# Patient Record
Sex: MALE | Race: WHITE | Employment: FULL TIME | URBAN - METROPOLITAN AREA
[De-identification: names, ages, dates, MRNs, and addresses within clinical notes are randomized per-mention and may not be internally consistent; named-entity substitution may affect disease eponyms.]

---

## 2021-08-30 ENCOUNTER — LAB REQUISITION (OUTPATIENT)
Dept: LAB | Facility: CLINIC | Age: 26
End: 2021-08-30

## 2021-08-30 LAB — HBV SURFACE AB SERPL IA-ACNC: 16.28 M[IU]/ML

## 2021-08-30 PROCEDURE — 86706 HEP B SURFACE ANTIBODY: CPT | Performed by: INTERNAL MEDICINE

## 2021-10-05 ENCOUNTER — HOSPITAL ENCOUNTER (EMERGENCY)
Facility: CLINIC | Age: 26
Discharge: HOME | End: 2021-10-05
Attending: EMERGENCY MEDICINE | Admitting: EMERGENCY MEDICINE
Payer: OTHER MISCELLANEOUS

## 2021-10-05 ENCOUNTER — APPOINTMENT (OUTPATIENT)
Dept: CT IMAGING | Facility: CLINIC | Age: 26
End: 2021-10-05
Attending: EMERGENCY MEDICINE
Payer: OTHER MISCELLANEOUS

## 2021-10-05 ENCOUNTER — APPOINTMENT (OUTPATIENT)
Dept: GENERAL RADIOLOGY | Facility: CLINIC | Age: 26
End: 2021-10-05
Attending: EMERGENCY MEDICINE
Payer: OTHER MISCELLANEOUS

## 2021-10-05 VITALS
WEIGHT: 165 LBS | HEIGHT: 73 IN | DIASTOLIC BLOOD PRESSURE: 72 MMHG | BODY MASS INDEX: 21.87 KG/M2 | SYSTOLIC BLOOD PRESSURE: 114 MMHG | HEART RATE: 91 BPM | OXYGEN SATURATION: 98 % | RESPIRATION RATE: 16 BRPM | TEMPERATURE: 98.5 F

## 2021-10-05 DIAGNOSIS — S16.1XXA ACUTE STRAIN OF NECK MUSCLE, INITIAL ENCOUNTER: ICD-10-CM

## 2021-10-05 DIAGNOSIS — S09.90XA CLOSED HEAD INJURY, INITIAL ENCOUNTER: ICD-10-CM

## 2021-10-05 PROCEDURE — 72040 X-RAY EXAM NECK SPINE 2-3 VW: CPT

## 2021-10-05 PROCEDURE — 99284 EMERGENCY DEPT VISIT MOD MDM: CPT | Mod: 25

## 2021-10-05 PROCEDURE — 70450 CT HEAD/BRAIN W/O DYE: CPT

## 2021-10-05 PROCEDURE — 250N000013 HC RX MED GY IP 250 OP 250 PS 637: Performed by: EMERGENCY MEDICINE

## 2021-10-05 RX ORDER — IBUPROFEN 600 MG/1
600 TABLET, FILM COATED ORAL ONCE
Status: COMPLETED | OUTPATIENT
Start: 2021-10-05 | End: 2021-10-05

## 2021-10-05 RX ORDER — ACETAMINOPHEN 500 MG
1000 TABLET ORAL ONCE
Status: COMPLETED | OUTPATIENT
Start: 2021-10-05 | End: 2021-10-05

## 2021-10-05 RX ORDER — CYCLOBENZAPRINE HCL 10 MG
10 TABLET ORAL 3 TIMES DAILY PRN
Qty: 20 TABLET | Refills: 0 | Status: SHIPPED | OUTPATIENT
Start: 2021-10-05 | End: 2021-10-11

## 2021-10-05 RX ADMIN — IBUPROFEN 600 MG: 600 TABLET ORAL at 14:35

## 2021-10-05 RX ADMIN — ACETAMINOPHEN 1000 MG: 500 TABLET, FILM COATED ORAL at 13:59

## 2021-10-05 ASSESSMENT — ENCOUNTER SYMPTOMS
NECK PAIN: 1
TROUBLE SWALLOWING: 0
ABDOMINAL PAIN: 0
HEADACHES: 1
NUMBNESS: 1
DIZZINESS: 1
SHORTNESS OF BREATH: 0

## 2021-10-05 ASSESSMENT — MIFFLIN-ST. JEOR: SCORE: 1792.32

## 2021-10-05 NOTE — ED TRIAGE NOTES
Presents to ED via EMS. 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 Pt c/o neck pain. EMS applied cervical collar. ABCs intact. A&OX4. No LOC.

## 2021-10-05 NOTE — ED NOTES
Bed: ED21  Expected date: 10/5/21  Expected time: 12:41 PM  Means of arrival: Ambulance  Comments:  bv3

## 2021-10-05 NOTE — ED PROVIDER NOTES
History   Chief Complaint:  Fall and Neck Pain       HPI   Piyush Arzola is a 26 year old male who presents with fall resulting in headache and neck pain. 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 After his injury he had numbness and tingling in his extremities and chest but this has resolved since arrival to the emergency department. He has a severe headache behind his left eye that started after his injury and notes his symptoms don't feel similar to previous migraines but is as severe as a migraine. Patient did ambulate after his fall but notes he was dizzy. He also notes he has severe neck pain. He denies any loss of consciousness, abdominal pain, trouble swallowing, or shortness of breath.     Review of Systems   HENT: Negative for trouble swallowing.    Respiratory: Negative for shortness of breath.    Gastrointestinal: Negative for abdominal pain.   Musculoskeletal: Positive for neck pain.   Neurological: Positive for dizziness, numbness and headaches. Negative for syncope.   All other systems reviewed and are negative.      Allergies:  The patient has no known allergies.     Medications:  The patient is not currently taking any prescribed medications.    Past Medical History:     The patient denies past medical history.     Social History:  The patient presents with acquaintance.    Physical Exam     Patient Vitals for the past 24 hrs:   BP Temp Temp src Pulse Resp SpO2 Height Weight   10/05/21 1530 114/72 -- -- 91 16 98 % -- --   10/05/21 1500 112/79 -- -- 92 -- -- -- --   10/05/21 1445 129/80 -- -- 102 -- -- -- --   10/05/21 1430 116/72 -- -- 79 -- 99 % -- --   10/05/21 1415 113/73 -- -- 81 -- 98 % -- --   10/05/21 1400 124/80 -- -- 99 -- 98 % -- --   10/05/21 1330 118/79 -- -- 95 -- 97 % -- --   10/05/21 1315 118/72 -- -- 83 -- 98 % -- --   10/05/21 1300 128/80 -- -- 104 -- 98 % -- --  "  10/05/21 1250 129/82 98.5  F (36.9  C) Oral 108 16 95 % 1.854 m (6' 1\") 74.8 kg (165 lb)       Physical Exam  General: Adult male sitting upright  Eyes: PERRL, Conjunctive within normal limits.  EOMI.   HENT: No scalp tenderness or palpable hematoma or skull deformity.  No hemotympanum.  Moist mucous membranes, oropharynx clear.   Neck: Maintained in rigid cervical collar.  C-spine precautions kept in place.  Tender mid cervical spine midline without step-off or crepitus.  CV: Normal S1S2, no murmur, rub or gallop. Regular rate and rhythm.  Radial pulses palpable and equal bilaterally.  Resp: Clear to auscultation bilaterally, no wheezes, rales or rhonchi. Normal respiratory effort.  MSK: No edema. Nontender. Normal active range of motion of all 4 extremities..  Skin: Warm and dry. No rashes or lesions or ecchymoses on visible skin.  Neuro: Alert and oriented. Responds appropriately to all questions and commands. No focal findings appreciated. Normal muscle tone.  5 out of 5 finger abduction, thumb opposition and wrist extension strength bilaterally.  Sensation intact to light touch over all dermatomes of the bilateral upper extremities.  Psych: Appropriate mood and affect.     Emergency Department Course   Imaging:  Cervical spine XR, 2-3 views  There is normal alignment of the cervical vertebrae;   however, there is straightening of normal cervical lordosis. Vertebral   body heights of the cervical spine are normal. Craniocervical   alignment is normal. There is no evidence for fracture of the cervical   spine.   Per radiology    CT Head w/o Contrast  Normal head CT.     Radiation dose for this scan was reduced using automated exposure   control, adjustment of the mA and/or kV according to patient size, or   iterative reconstruction technique.  Per radiology    Emergency Department Course:  Reviewed:  I reviewed nursing notes and vitals    Assessments:  1319 I obtained history and examined the patient as noted " above.   1522 I rechecked the patient and explained findings.     Interventions:  1359 Tylenol 1,000 mg PO  1435 ibuprofen 600 mg PO    Disposition:  The patient was discharged to home.     Impression & Plan       Medical Decision Making:  This patient presents with a headache and neck pain after a fall.  The differential diagnosis includes skull fracture, epidural hematoma, subdural hematoma, intracerebral hemorrhage, and traumatic subarachnoid hemorrhage, cervical strain/sprain, cervical fracture etc.  There are no physical signs of skull fracture or other bony fracture on exam and the patient is well-appearing, but given severity of headache, head CT was obtained. Fortunately, no signs of intracerebral bleed or skull fracture were detected during this visit on CT imaging. The patient is aware that he may have a concussion. Delayed bleed seems unlikely in this healthy young male but this is discussed in discharge instructions. Closed head injury instructions were given.  He also presents for evaluation of neck pain after this fall. Clinical examination is most consistent with cervical strain. There is no clinical or radiographic evidence of fracture. There is no evidence of cervical radiculopathy, ligamentous instability, myelopathy, dissection, spinal tumor or abscess at this time. I discussed worrisome symptoms/signs, if they were to evolve, that should prompt the patient to follow up more quickly or return to the ED. There are no red flag symptoms to suggest we need further workup or advanced imaging at this point. Their head to toe trauma exam is otherwise benign and reassuring. Supportive outpatient management is indicated. He understands importance of follow-up with PCP within the next 3 to 5 days with any ongoing symptoms. He may consider concussion clinic as well. All questions were answered prior to discharge.    Diagnosis:    ICD-10-CM    1. Closed head injury, initial encounter  S09.90XA    2. Acute  strain of neck muscle, initial encounter  S16.1XXA        Discharge Medications:  Discharge Medication List as of 10/5/2021  3:24 PM      START taking these medications    Details   cyclobenzaprine (FLEXERIL) 10 MG tablet Take 1 tablet (10 mg) by mouth 3 times daily as needed for muscle spasms, Disp-20 tablet, R-0, Local Print             Scribe Disclosure:  I, Kerri Pierson, am serving as a scribe at 1:19 PM on 10/5/2021 to document services personally performed by Anita Mitchell MD based on my observations and the provider's statements to me.            Anita Mitchell MD  10/06/21 [id number]

## 2021-10-05 NOTE — ED NOTES
Pt ambulated well in hallway. Complaining of some neck pain with movement, no other symptoms. Pt ate food.

## 2022-02-10 ENCOUNTER — LAB REQUISITION (OUTPATIENT)
Dept: LAB | Facility: CLINIC | Age: 27
End: 2022-02-10

## 2022-02-10 PROCEDURE — U0003 INFECTIOUS AGENT DETECTION BY NUCLEIC ACID (DNA OR RNA); SEVERE ACUTE RESPIRATORY SYNDROME CORONAVIRUS 2 (SARS-COV-2) (CORONAVIRUS DISEASE [COVID-19]), AMPLIFIED PROBE TECHNIQUE, MAKING USE OF HIGH THROUGHPUT TECHNOLOGIES AS DESCRIBED BY CMS-2020-01-R: HCPCS | Performed by: INTERNAL MEDICINE

## 2022-02-11 LAB — SARS-COV-2 RNA RESP QL NAA+PROBE: NEGATIVE

## 2022-03-21 ENCOUNTER — LAB REQUISITION (OUTPATIENT)
Dept: LAB | Facility: CLINIC | Age: 27
End: 2022-03-21

## 2022-03-21 PROCEDURE — U0005 INFEC AGEN DETEC AMPLI PROBE: HCPCS | Performed by: INTERNAL MEDICINE

## 2022-03-22 LAB — SARS-COV-2 RNA RESP QL NAA+PROBE: NEGATIVE

## 2022-03-28 PROCEDURE — 87635 SARS-COV-2 COVID-19 AMP PRB: CPT | Performed by: INTERNAL MEDICINE

## 2022-03-30 ENCOUNTER — LAB REQUISITION (OUTPATIENT)
Dept: LAB | Facility: CLINIC | Age: 27
End: 2022-03-30

## 2022-03-30 LAB — SARS-COV-2 RNA RESP QL NAA+PROBE: NEGATIVE

## 2022-04-25 ENCOUNTER — LAB REQUISITION (OUTPATIENT)
Dept: LAB | Facility: CLINIC | Age: 27
End: 2022-04-25

## 2022-04-25 PROCEDURE — U0005 INFEC AGEN DETEC AMPLI PROBE: HCPCS | Performed by: INTERNAL MEDICINE

## 2022-04-26 LAB — SARS-COV-2 RNA RESP QL NAA+PROBE: NEGATIVE

## 2022-05-05 ENCOUNTER — LAB REQUISITION (OUTPATIENT)
Dept: LAB | Facility: CLINIC | Age: 27
End: 2022-05-05

## 2022-05-05 PROCEDURE — U0005 INFEC AGEN DETEC AMPLI PROBE: HCPCS | Performed by: INTERNAL MEDICINE

## 2022-05-06 LAB — SARS-COV-2 RNA RESP QL NAA+PROBE: NEGATIVE

## 2022-05-23 ENCOUNTER — LAB REQUISITION (OUTPATIENT)
Dept: LAB | Facility: CLINIC | Age: 27
End: 2022-05-23

## 2022-05-23 PROCEDURE — U0003 INFECTIOUS AGENT DETECTION BY NUCLEIC ACID (DNA OR RNA); SEVERE ACUTE RESPIRATORY SYNDROME CORONAVIRUS 2 (SARS-COV-2) (CORONAVIRUS DISEASE [COVID-19]), AMPLIFIED PROBE TECHNIQUE, MAKING USE OF HIGH THROUGHPUT TECHNOLOGIES AS DESCRIBED BY CMS-2020-01-R: HCPCS | Performed by: INTERNAL MEDICINE

## 2022-05-24 LAB — SARS-COV-2 RNA RESP QL NAA+PROBE: NEGATIVE

## 2022-06-06 PROCEDURE — U0005 INFEC AGEN DETEC AMPLI PROBE: HCPCS | Performed by: INTERNAL MEDICINE

## 2022-06-07 ENCOUNTER — LAB REQUISITION (OUTPATIENT)
Dept: LAB | Facility: CLINIC | Age: 27
End: 2022-06-07

## 2022-06-07 LAB — SARS-COV-2 RNA RESP QL NAA+PROBE: NEGATIVE

## 2022-06-20 ENCOUNTER — LAB REQUISITION (OUTPATIENT)
Dept: LAB | Facility: CLINIC | Age: 27
End: 2022-06-20

## 2022-06-20 PROCEDURE — U0005 INFEC AGEN DETEC AMPLI PROBE: HCPCS | Performed by: INTERNAL MEDICINE

## 2022-06-21 LAB — SARS-COV-2 RNA RESP QL NAA+PROBE: NEGATIVE

## 2022-07-18 ENCOUNTER — LAB REQUISITION (OUTPATIENT)
Dept: LAB | Facility: CLINIC | Age: 27
End: 2022-07-18

## 2022-07-18 PROCEDURE — U0005 INFEC AGEN DETEC AMPLI PROBE: HCPCS | Performed by: INTERNAL MEDICINE

## 2022-07-19 LAB — SARS-COV-2 RNA RESP QL NAA+PROBE: NEGATIVE

## 2022-07-21 ENCOUNTER — LAB REQUISITION (OUTPATIENT)
Dept: LAB | Facility: CLINIC | Age: 27
End: 2022-07-21

## 2022-07-21 PROCEDURE — U0005 INFEC AGEN DETEC AMPLI PROBE: HCPCS | Performed by: INTERNAL MEDICINE

## 2022-07-22 LAB — SARS-COV-2 RNA RESP QL NAA+PROBE: NEGATIVE

## 2022-07-28 ENCOUNTER — LAB REQUISITION (OUTPATIENT)
Dept: LAB | Facility: CLINIC | Age: 27
End: 2022-07-28

## 2022-07-28 PROCEDURE — U0005 INFEC AGEN DETEC AMPLI PROBE: HCPCS | Performed by: INTERNAL MEDICINE

## 2022-07-29 LAB — SARS-COV-2 RNA RESP QL NAA+PROBE: NEGATIVE
